# Patient Record
Sex: MALE | Race: WHITE | NOT HISPANIC OR LATINO | Employment: OTHER | URBAN - NONMETROPOLITAN AREA
[De-identification: names, ages, dates, MRNs, and addresses within clinical notes are randomized per-mention and may not be internally consistent; named-entity substitution may affect disease eponyms.]

---

## 2017-01-04 RX ORDER — TEMAZEPAM 30 MG/1
CAPSULE ORAL
Qty: 30 CAPSULE | Refills: 2 | OUTPATIENT
Start: 2017-01-04

## 2017-01-09 ENCOUNTER — OFFICE VISIT (OUTPATIENT)
Dept: FAMILY MEDICINE CLINIC | Facility: CLINIC | Age: 72
End: 2017-01-09

## 2017-01-09 VITALS
RESPIRATION RATE: 16 BRPM | SYSTOLIC BLOOD PRESSURE: 126 MMHG | OXYGEN SATURATION: 97 % | HEIGHT: 70 IN | DIASTOLIC BLOOD PRESSURE: 72 MMHG | TEMPERATURE: 97.6 F | BODY MASS INDEX: 30.49 KG/M2 | WEIGHT: 213 LBS | HEART RATE: 74 BPM

## 2017-01-09 DIAGNOSIS — L03.90 CELLULITIS, UNSPECIFIED CELLULITIS SITE: ICD-10-CM

## 2017-01-09 DIAGNOSIS — G47.00 INSOMNIA, UNSPECIFIED TYPE: Primary | ICD-10-CM

## 2017-01-09 DIAGNOSIS — M51.37 DDD (DEGENERATIVE DISC DISEASE), LUMBOSACRAL: ICD-10-CM

## 2017-01-09 PROCEDURE — 99214 OFFICE O/P EST MOD 30 MIN: CPT | Performed by: FAMILY MEDICINE

## 2017-01-09 RX ORDER — TEMAZEPAM 30 MG/1
30 CAPSULE ORAL NIGHTLY PRN
Qty: 30 CAPSULE | Refills: 2 | Status: SHIPPED | OUTPATIENT
Start: 2017-01-09 | End: 2017-05-05 | Stop reason: SDUPTHER

## 2017-01-09 RX ORDER — CEPHALEXIN 500 MG/1
500 CAPSULE ORAL 3 TIMES DAILY
Qty: 21 CAPSULE | Refills: 0 | Status: SHIPPED | OUTPATIENT
Start: 2017-01-09 | End: 2017-05-05

## 2017-01-09 NOTE — MR AVS SNAPSHOT
Rosales Vazquez   1/9/2017 8:00 AM   Office Visit    Dept Phone:  [phone]   Encounter #:  [id number]    Provider:  Tato Rinaldi MD   Department:  [facility]                Your Full Care Plan              Today's Medication Changes          These changes are accurate as of: 1/9/17 10:16 AM.  If you have any questions, ask your nurse or doctor.               New Medication(s)Ordered:     cephalexin 500 MG capsule   Commonly known as:  KEFLEX   Take 1 capsule by mouth 3 (Three) Times a Day.   Started by:  Tato Rinaldi MD         Stop taking medication(s)listed here:     levoFLOXacin 750 MG tablet   Commonly known as:  LEVAQUIN   Stopped by:  Tato Rinaldi MD           MethylPREDNISolone 4 MG tablet   Commonly known as:  MEDROL (SONIDO)   Stopped by:  Tato Rinaldi MD                Where to Get Your Medications      These medications [facility] sent to [facility] [address] - [phone] [facility] [phone] [facility] [address]     Phone:  [phone]     cephalexin 500 MG capsule         You can get these medications from any pharmacy     Bring a paper prescription for each of these medications     temazepam 30 MG capsule                  Your Updated Medication List          This list is accurate as of: 1/9/17 10:16 AM.  Always use your most recent med list.                cephalexin 500 MG capsule   Commonly known as:  KEFLEX   Take 1 capsule by mouth 3 (Three) Times a Day.       cetirizine 10 MG tablet   Commonly known as:  zyrTEC   TAKE 1 TABLET DAILY       DEXILANT 60 MG capsule   Generic drug:  dexlansoprazole       fluticasone 50 MCG/ACT nasal spray   Commonly known as:  FLONASE   2 sprays into each nostril Daily for 30 days.       irbesartan 300 MG tablet   Commonly known as:  AVAPRO   Take 1 tablet by mouth Daily.       PATADAY 0.2 % solution ophthalmic solution   Generic  "drug:  olopatadine       PROAIR  (90 BASE) MCG/ACT inhaler   Generic drug:  albuterol   INHALE 2 PUFFS FOUR TIMES DAILY AS NEEDED       temazepam 30 MG capsule   Commonly known as:  RESTORIL   Take 1 capsule by mouth At Night As Needed for sleep.               We Performed the Following     Ambulatory Referral to Neurosurgery     Urine Drug Screen       You Were Diagnosed With        Codes Comments    Insomnia, unspecified type    -  Primary ICD-10-CM: G47.00  ICD-9-CM: 780.52     Cellulitis, unspecified cellulitis site     ICD-10-CM: L03.90  ICD-9-CM: 682.9     DDD (degenerative disc disease), lumbosacral     ICD-10-CM: M51.37  ICD-9-CM: 722.52       Instructions     None    Patient Instructions History      Upcoming Appointments     Visit Type Date Time Department    OFFICE VISIT 1/9/2017  8:00 AM [facility] [employer]    OFFICE VISIT 6/14/2017  8:00 AM [facility]      MyChart Signup     Our records indicate that you have declined [employer]t signup. If you would like to sign up for [employer], please email [email or web address] or call [phone] to obtain an activation code.             Other Info from Your Visit           Your Appointments     Jun 14, 2017  8:00 AM CDT   Office Visit with Tato Rinaldi MD   [facility] (--)    [address] [place] [id number] [phone]           Arrive 15 minutes prior to appointment.              Allergies     Bee Venom      Bee and wasp stings      Reason for Visit     Insomnia 3 month f/u     Med Refill restoril       Vital Signs     Blood Pressure Pulse Temperature Respirations Height Weight    126/72 74 97.6 °F (36.4 °C) (Tympanic) 16 69.5\" (176.5 cm) 213 lb (96.6 kg)    Oxygen Saturation Body Mass Index Smoking Status             97% 31 kg/m2 Former Smoker         Problems and Diagnoses Noted     DDD (degenerative disc disease), lumbosacral    Difficulty falling or staying asleep    " Bacterial skin infection

## 2017-01-09 NOTE — PROGRESS NOTES
Subjective   Rosales Vazquez is a 71 y.o. male.   Chief Complaint   Patient presents with   • Insomnia     3 month f/u    • Med Refill     restoril        History of Present Illness   Patient with history of insomnia is in today for reevaluation and refill of controlled medication.  Medication is still effective and he is tolerating without side effect.  Patient getting at least 7 hours sleep per night.    Patient is also continuing to complain of worsening lower back pain.  History of degenerative disc disease and is requesting referral to neurosurgery.  Also complains of pain and redness at the base of the right great toe  The following portions of the patient's history were reviewed and updated as appropriate: allergies, current medications, past family history, past medical history, past social history, past surgical history and problem list.    Review of Systems   Constitutional: Negative.    HENT: Negative.    Eyes: Negative.    Respiratory: Negative.    Cardiovascular: Negative.    Gastrointestinal: Negative.    Endocrine: Negative.    Genitourinary: Negative.    Musculoskeletal: Positive for arthralgias and back pain.   Skin: Positive for color change.   Allergic/Immunologic: Negative.    Neurological: Negative.    Hematological: Negative.    Psychiatric/Behavioral: Positive for sleep disturbance.   All other systems reviewed and are negative.      Objective   Physical Exam   Constitutional: He is oriented to person, place, and time. He appears well-developed and well-nourished.   HENT:   Head: Normocephalic and atraumatic.   Right Ear: External ear normal.   Left Ear: External ear normal.   Nose: Nose normal.   Mouth/Throat: Oropharynx is clear and moist.   Eyes: Conjunctivae and EOM are normal. Pupils are equal, round, and reactive to light.   Neck: Normal range of motion. Neck supple.   Cardiovascular: Normal rate, regular rhythm, normal heart sounds and intact distal pulses.  Exam reveals no gallop  and no friction rub.    No murmur heard.  Pulmonary/Chest: Effort normal and breath sounds normal. He has no wheezes. He has no rales.   Abdominal: Soft. Bowel sounds are normal. He exhibits no mass. There is no tenderness. There is no rebound and no guarding.   Musculoskeletal:        Lumbar back: He exhibits decreased range of motion, tenderness and pain.   Neurological: He is alert and oriented to person, place, and time. He has normal reflexes. No cranial nerve deficit. He exhibits normal muscle tone.   Skin: Skin is warm and dry. No rash noted. There is erythema.        Psychiatric: He has a normal mood and affect. His behavior is normal. Judgment and thought content normal.   Nursing note and vitals reviewed.      Assessment/Plan   Rosales was seen today for insomnia and med refill.    Diagnoses and all orders for this visit:    Insomnia, unspecified type  -     Urine Drug Screen    Cellulitis, unspecified cellulitis site    DDD (degenerative disc disease), lumbosacral  -     Ambulatory Referral to Neurosurgery    Other orders  -     temazepam (RESTORIL) 30 MG capsule; Take 1 capsule by mouth At Night As Needed for sleep.  -     cephalexin (KEFLEX) 500 MG capsule; Take 1 capsule by mouth 3 (Three) Times a Day.

## 2017-01-10 LAB
AMPHETAMINES UR QL: NEGATIVE
BARBITURATES UR QL: NEGATIVE
BENZODIAZ UR QL: NEGATIVE
CANNABINOIDS UR QL SCN: NEGATIVE
COCAINE UR QL: NEGATIVE
METHADONE UR QL: NEGATIVE
OPIATES UR QL: NEGATIVE
OXYCODONE UR QL: NEGATIVE

## 2017-03-31 RX ORDER — TEMAZEPAM 30 MG/1
CAPSULE ORAL
Qty: 30 CAPSULE | Refills: 2 | OUTPATIENT
Start: 2017-03-31

## 2017-05-05 ENCOUNTER — OFFICE VISIT (OUTPATIENT)
Dept: FAMILY MEDICINE CLINIC | Facility: CLINIC | Age: 72
End: 2017-05-05

## 2017-05-05 VITALS
WEIGHT: 221 LBS | BODY MASS INDEX: 32.73 KG/M2 | RESPIRATION RATE: 14 BRPM | DIASTOLIC BLOOD PRESSURE: 80 MMHG | SYSTOLIC BLOOD PRESSURE: 152 MMHG | HEIGHT: 69 IN | OXYGEN SATURATION: 98 % | HEART RATE: 82 BPM | TEMPERATURE: 98.4 F

## 2017-05-05 DIAGNOSIS — M51.37 DDD (DEGENERATIVE DISC DISEASE), LUMBOSACRAL: ICD-10-CM

## 2017-05-05 DIAGNOSIS — J22 LOWER RESP. TRACT INFECTION: ICD-10-CM

## 2017-05-05 DIAGNOSIS — R05.9 COUGH: ICD-10-CM

## 2017-05-05 DIAGNOSIS — R06.2 WHEEZING: ICD-10-CM

## 2017-05-05 DIAGNOSIS — G47.00 INSOMNIA, UNSPECIFIED TYPE: Primary | ICD-10-CM

## 2017-05-05 PROCEDURE — 96372 THER/PROPH/DIAG INJ SC/IM: CPT | Performed by: NURSE PRACTITIONER

## 2017-05-05 PROCEDURE — 99214 OFFICE O/P EST MOD 30 MIN: CPT | Performed by: NURSE PRACTITIONER

## 2017-05-05 RX ORDER — AZITHROMYCIN 250 MG/1
TABLET, FILM COATED ORAL
Qty: 6 TABLET | Refills: 0 | Status: SHIPPED | OUTPATIENT
Start: 2017-05-05 | End: 2017-07-03

## 2017-05-05 RX ORDER — TEMAZEPAM 30 MG/1
CAPSULE ORAL
Qty: 30 CAPSULE | Refills: 2 | OUTPATIENT
Start: 2017-05-05

## 2017-05-05 RX ORDER — TEMAZEPAM 30 MG/1
30 CAPSULE ORAL NIGHTLY PRN
Qty: 30 CAPSULE | Refills: 2 | Status: SHIPPED | OUTPATIENT
Start: 2017-05-05 | End: 2017-08-02 | Stop reason: SDUPTHER

## 2017-05-05 RX ORDER — METHYLPREDNISOLONE ACETATE 80 MG/ML
80 INJECTION, SUSPENSION INTRA-ARTICULAR; INTRALESIONAL; INTRAMUSCULAR; SOFT TISSUE ONCE
Status: COMPLETED | OUTPATIENT
Start: 2017-05-05 | End: 2017-05-05

## 2017-05-05 RX ADMIN — METHYLPREDNISOLONE ACETATE 80 MG: 80 INJECTION, SUSPENSION INTRA-ARTICULAR; INTRALESIONAL; INTRAMUSCULAR; SOFT TISSUE at 15:53

## 2017-06-02 RX ORDER — CETIRIZINE HYDROCHLORIDE 10 MG/1
TABLET ORAL
Qty: 30 TABLET | Refills: 5 | Status: SHIPPED | OUTPATIENT
Start: 2017-06-02 | End: 2017-08-02 | Stop reason: SDUPTHER

## 2017-06-15 DIAGNOSIS — K21.9 GASTROESOPHAGEAL REFLUX DISEASE WITHOUT ESOPHAGITIS: Primary | ICD-10-CM

## 2017-06-15 DIAGNOSIS — K30 DELAYED GASTRIC EMPTYING: ICD-10-CM

## 2017-06-15 RX ORDER — DEXLANSOPRAZOLE 60 MG/1
CAPSULE, DELAYED RELEASE ORAL
Qty: 90 CAPSULE | Refills: 2 | Status: CANCELLED | OUTPATIENT
Start: 2017-06-15

## 2017-06-15 RX ORDER — DEXLANSOPRAZOLE 60 MG/1
60 CAPSULE, DELAYED RELEASE ORAL DAILY
Qty: 30 CAPSULE | Refills: 2 | Status: SHIPPED | OUTPATIENT
Start: 2017-06-15 | End: 2017-06-16 | Stop reason: SDUPTHER

## 2017-06-15 NOTE — TELEPHONE ENCOUNTER
06/15/2017, 0822 - Patient telephoned [phone] per this staff member with notification of patient need to schedule clinical appointment with Dr. Yost for continuation of prescription medication, Dexilant 60 MG, 1 capsule by mouth daily.  Patient agreeable to accept next available clinical appointment Tuesday, August 22, 2017 at 1:45 p.m.  Prescription medication, Dexilant 60 MG, 1 capsule by mouth daily, #30, 2 refills, submitted via e-script to patient's pharmacy of choice, [facility].

## 2017-06-16 DIAGNOSIS — K30 DELAYED GASTRIC EMPTYING: ICD-10-CM

## 2017-06-16 DIAGNOSIS — K21.9 GASTROESOPHAGEAL REFLUX DISEASE WITHOUT ESOPHAGITIS: ICD-10-CM

## 2017-06-16 RX ORDER — DEXLANSOPRAZOLE 60 MG/1
60 CAPSULE, DELAYED RELEASE ORAL DAILY
Qty: 90 CAPSULE | Refills: 1 | Status: SHIPPED | OUTPATIENT
Start: 2017-06-16 | End: 2017-08-22 | Stop reason: SDUPTHER

## 2017-06-19 ENCOUNTER — LAB (OUTPATIENT)
Dept: LAB | Facility: OTHER | Age: 72
End: 2017-06-19

## 2017-06-19 DIAGNOSIS — E78.5 HYPERLIPIDEMIA, UNSPECIFIED HYPERLIPIDEMIA TYPE: ICD-10-CM

## 2017-06-19 DIAGNOSIS — R73.9 HYPERGLYCEMIA: ICD-10-CM

## 2017-06-19 LAB
ALBUMIN SERPL-MCNC: 4 G/DL (ref 3.2–5.5)
ALBUMIN/GLOB SERPL: 1.3 G/DL (ref 1–3)
ALP SERPL-CCNC: 87 U/L (ref 15–121)
ALT SERPL W P-5'-P-CCNC: 21 U/L (ref 10–60)
ANION GAP SERPL CALCULATED.3IONS-SCNC: 7 MMOL/L (ref 5–15)
ARTICHOKE IGE QN: 163 MG/DL (ref 0–129)
AST SERPL-CCNC: 23 U/L (ref 10–60)
BILIRUB SERPL-MCNC: 0.7 MG/DL (ref 0.2–1)
BUN BLD-MCNC: 14 MG/DL (ref 8–25)
BUN/CREAT SERPL: 17.5 (ref 7–25)
CALCIUM SPEC-SCNC: 9.1 MG/DL (ref 8.4–10.8)
CHLORIDE SERPL-SCNC: 103 MMOL/L (ref 100–112)
CO2 SERPL-SCNC: 28 MMOL/L (ref 20–32)
CREAT BLD-MCNC: 0.8 MG/DL (ref 0.4–1.3)
GFR SERPL CREATININE-BSD FRML MDRD: 95 ML/MIN/1.73 (ref 42–98)
GLOBULIN UR ELPH-MCNC: 3.1 GM/DL (ref 2.5–4.6)
GLUCOSE BLD-MCNC: 118 MG/DL (ref 70–100)
POTASSIUM BLD-SCNC: 4.4 MMOL/L (ref 3.4–5.4)
PROT SERPL-MCNC: 7.1 G/DL (ref 6.7–8.2)
SODIUM BLD-SCNC: 138 MMOL/L (ref 134–146)

## 2017-06-19 PROCEDURE — 83721 ASSAY OF BLOOD LIPOPROTEIN: CPT | Performed by: FAMILY MEDICINE

## 2017-06-19 PROCEDURE — 36415 COLL VENOUS BLD VENIPUNCTURE: CPT | Performed by: FAMILY MEDICINE

## 2017-06-19 PROCEDURE — 80053 COMPREHEN METABOLIC PANEL: CPT | Performed by: FAMILY MEDICINE

## 2017-06-19 PROCEDURE — 83036 HEMOGLOBIN GLYCOSYLATED A1C: CPT | Performed by: FAMILY MEDICINE

## 2017-06-20 LAB — HBA1C MFR BLD: 5.85 % (ref 4–5.6)

## 2017-07-03 ENCOUNTER — OFFICE VISIT (OUTPATIENT)
Dept: FAMILY MEDICINE CLINIC | Facility: CLINIC | Age: 72
End: 2017-07-03

## 2017-07-03 VITALS
WEIGHT: 214 LBS | SYSTOLIC BLOOD PRESSURE: 138 MMHG | RESPIRATION RATE: 16 BRPM | HEART RATE: 76 BPM | OXYGEN SATURATION: 98 % | TEMPERATURE: 98.7 F | DIASTOLIC BLOOD PRESSURE: 68 MMHG | BODY MASS INDEX: 31.7 KG/M2 | HEIGHT: 69 IN

## 2017-07-03 DIAGNOSIS — K21.9 GASTROESOPHAGEAL REFLUX DISEASE WITHOUT ESOPHAGITIS: ICD-10-CM

## 2017-07-03 DIAGNOSIS — I10 ESSENTIAL HYPERTENSION: Primary | ICD-10-CM

## 2017-07-03 DIAGNOSIS — J32.9 SINUSITIS, UNSPECIFIED CHRONICITY, UNSPECIFIED LOCATION: ICD-10-CM

## 2017-07-03 DIAGNOSIS — M51.37 DDD (DEGENERATIVE DISC DISEASE), LUMBOSACRAL: ICD-10-CM

## 2017-07-03 DIAGNOSIS — Z12.5 ENCOUNTER FOR SCREENING FOR MALIGNANT NEOPLASM OF PROSTATE: ICD-10-CM

## 2017-07-03 DIAGNOSIS — R73.9 HYPERGLYCEMIA: ICD-10-CM

## 2017-07-03 PROCEDURE — 99214 OFFICE O/P EST MOD 30 MIN: CPT | Performed by: FAMILY MEDICINE

## 2017-07-03 PROCEDURE — 96372 THER/PROPH/DIAG INJ SC/IM: CPT | Performed by: FAMILY MEDICINE

## 2017-07-03 RX ORDER — BIMATOPROST 0.01 %
DROPS OPHTHALMIC (EYE)
COMMUNITY
Start: 2017-05-14

## 2017-07-03 RX ORDER — CEFDINIR 300 MG/1
300 CAPSULE ORAL 2 TIMES DAILY
Qty: 20 CAPSULE | Refills: 0 | Status: SHIPPED | OUTPATIENT
Start: 2017-07-03 | End: 2017-08-02

## 2017-07-03 RX ORDER — METHYLPREDNISOLONE ACETATE 80 MG/ML
80 INJECTION, SUSPENSION INTRA-ARTICULAR; INTRALESIONAL; INTRAMUSCULAR; SOFT TISSUE ONCE
Status: COMPLETED | OUTPATIENT
Start: 2017-07-03 | End: 2017-07-03

## 2017-07-03 RX ORDER — BENZONATATE 100 MG/1
100 CAPSULE ORAL 3 TIMES DAILY PRN
Qty: 30 CAPSULE | Refills: 0 | Status: SHIPPED | OUTPATIENT
Start: 2017-07-03 | End: 2017-08-02

## 2017-07-03 RX ADMIN — METHYLPREDNISOLONE ACETATE 80 MG: 80 INJECTION, SUSPENSION INTRA-ARTICULAR; INTRALESIONAL; INTRAMUSCULAR; SOFT TISSUE at 08:37

## 2017-07-03 NOTE — PROGRESS NOTES
Subjective   Rosales Vazquez is a 71 y.o. male who presents to the office for follow-up and review of labs.     History of Present Illness   Patient with history of hypertension, hyperglycemia, and hyperlipidemia is in today for reevaluation and review labs.  Patient is complaining of one-week history of cough congestion with purulent sputum.  He denies any fevers.  Otherwise he has been doing well with no chest pains PND or orthopnea.  Denies neurological symptoms.    The following portions of the patient's history were reviewed and updated as appropriate: allergies, current medications, past family history, past medical history, past social history, past surgical history and problem list.    Review of Systems   Constitutional: Positive for fatigue.   HENT: Positive for congestion and sore throat.    Eyes: Negative.    Respiratory: Positive for cough.    Cardiovascular: Negative.    Gastrointestinal: Negative.    Endocrine: Negative.    Genitourinary: Negative.    Musculoskeletal: Positive for arthralgias and back pain.   Skin: Negative.    Allergic/Immunologic: Negative.    Neurological: Negative.    Hematological: Negative.    Psychiatric/Behavioral: Positive for sleep disturbance.   All other systems reviewed and are negative.      Objective   Physical Exam   Constitutional: He is oriented to person, place, and time. He appears well-developed and well-nourished.   HENT:   Head: Normocephalic and atraumatic.   Right Ear: External ear normal.   Left Ear: External ear normal.   Nose: Mucosal edema and rhinorrhea present.   Mouth/Throat: Posterior oropharyngeal edema present.   Eyes: Conjunctivae and EOM are normal. Pupils are equal, round, and reactive to light.   Neck: Normal range of motion. Neck supple.   Cardiovascular: Normal rate, regular rhythm, normal heart sounds and intact distal pulses.  Exam reveals no gallop and no friction rub.    No murmur heard.  Pulmonary/Chest: Effort normal. He has no  wheezes. He has rhonchi. He has no rales.   Abdominal: Soft. Bowel sounds are normal. He exhibits no mass. There is no tenderness. There is no rebound and no guarding.   Musculoskeletal: Normal range of motion.   Neurological: He is alert and oriented to person, place, and time. He has normal reflexes. No cranial nerve deficit. He exhibits normal muscle tone.   Skin: Skin is warm and dry. No rash noted.   Psychiatric: He has a normal mood and affect. His behavior is normal. Judgment and thought content normal.   Nursing note and vitals reviewed.      Assessment/Plan   Rosales was seen today for hypertension, hyperlipidemia and uri.    Diagnoses and all orders for this visit:    Essential hypertension  -     CBC & Differential; Future  -     Comprehensive Metabolic Panel; Future  -     Lipid Panel; Future  -     TSH; Future  -     PSA Screen; Future    Gastroesophageal reflux disease without esophagitis    DDD (degenerative disc disease), lumbosacral    Hyperglycemia  -     Hemoglobin A1c; Future    Sinusitis, unspecified chronicity, unspecified location  -     methylPREDNISolone acetate (DEPO-medrol) injection 80 mg; Inject 1 mL into the shoulder, thigh, or buttocks 1 (One) Time.    Encounter for screening for malignant neoplasm of prostate   -     PSA Screen; Future    Other orders  -     cefdinir (OMNICEF) 300 MG capsule; Take 1 capsule by mouth 2 (Two) Times a Day.  -     benzonatate (TESSALON PERLES) 100 MG capsule; Take 1 capsule by mouth 3 (Three) Times a Day As Needed for Cough.    Patient refuses statins secondary to side effects     Labs are reviewed with patient.    PHQ-9 Depression Screening 7/3/2017   Little interest or pleasure in doing things 0   Feeling down, depressed, or hopeless 0   PHQ-9 Total Score 0         Lab on 06/19/2017   Component Date Value Ref Range Status   • Glucose 06/19/2017 118* 70 - 100 mg/dL Final   • BUN 06/19/2017 14  8 - 25 mg/dL Final   • Creatinine 06/19/2017 0.80  0.40 -  1.30 mg/dL Final   • Sodium 06/19/2017 138  134 - 146 mmol/L Final   • Potassium 06/19/2017 4.4  3.4 - 5.4 mmol/L Final   • Chloride 06/19/2017 103  100 - 112 mmol/L Final   • CO2 06/19/2017 28.0  20.0 - 32.0 mmol/L Final   • Calcium 06/19/2017 9.1  8.4 - 10.8 mg/dL Final   • Total Protein 06/19/2017 7.1  6.7 - 8.2 g/dL Final   • Albumin 06/19/2017 4.00  3.20 - 5.50 g/dL Final   • ALT (SGPT) 06/19/2017 21  10 - 60 U/L Final   • AST (SGOT) 06/19/2017 23  10 - 60 U/L Final   • Alkaline Phosphatase 06/19/2017 87  15 - 121 U/L Final   • Total Bilirubin 06/19/2017 0.7  0.2 - 1.0 mg/dL Final   • eGFR Non African Amer 06/19/2017 95  42 - 98 mL/min/1.73 Final   • Globulin 06/19/2017 3.1  2.5 - 4.6 gm/dL Final   • A/G Ratio 06/19/2017 1.3  1.0 - 3.0 g/dL Final   • BUN/Creatinine Ratio 06/19/2017 17.5  7.0 - 25.0 Final   • Anion Gap 06/19/2017 7.0  5.0 - 15.0 mmol/L Final   • Hemoglobin A1C 06/19/2017 5.85* 4 - 5.6 % Final   • LDL Cholesterol  06/19/2017 163* 0 - 129 mg/dL Final   Office Visit on 01/09/2017   Component Date Value Ref Range Status   • Barbiturates Screen, Urine 01/09/2017 Negative  Negative Final   • Benzodiazepine Screen, Urine 01/09/2017 Negative  Negative Final   • Opiate Screen, Urine 01/09/2017 Negative  Negative Final   • THC Screen Interpretation 01/09/2017 Negative  Negative Final   • Amphet/Methamphet, Screen, Urine 01/09/2017 Negative  Negative Final   • Cocaine Screen, Urine 01/09/2017 Negative  Negative Final   • Oxycodone Screen, Urine 01/09/2017 Negative  Negative Final   • Methadone Screen, Urine 01/09/2017 Negative  Negative Final    Comment: CUTOFF VALUES  Benzo     200 ng/mL   Daniella        200 ng/mL           Cocaine   150 ng/mL   Amph      500 ng/mL  THC          20 ng/mL  Opiates    300 ng/mL  Oxycod    100 ng/mL  Mthadone 300 ng/mL       ]

## 2017-08-02 ENCOUNTER — OFFICE VISIT (OUTPATIENT)
Dept: FAMILY MEDICINE CLINIC | Facility: CLINIC | Age: 72
End: 2017-08-02

## 2017-08-02 VITALS
SYSTOLIC BLOOD PRESSURE: 132 MMHG | OXYGEN SATURATION: 94 % | TEMPERATURE: 98.1 F | BODY MASS INDEX: 31.07 KG/M2 | RESPIRATION RATE: 14 BRPM | WEIGHT: 217 LBS | HEART RATE: 79 BPM | DIASTOLIC BLOOD PRESSURE: 80 MMHG | HEIGHT: 70 IN

## 2017-08-02 DIAGNOSIS — J30.89 NON-SEASONAL ALLERGIC RHINITIS DUE TO OTHER ALLERGIC TRIGGER: ICD-10-CM

## 2017-08-02 DIAGNOSIS — G47.00 INSOMNIA, UNSPECIFIED TYPE: Primary | ICD-10-CM

## 2017-08-02 PROCEDURE — 99213 OFFICE O/P EST LOW 20 MIN: CPT | Performed by: FAMILY MEDICINE

## 2017-08-02 RX ORDER — CETIRIZINE HYDROCHLORIDE 10 MG/1
10 TABLET ORAL DAILY
Qty: 30 TABLET | Refills: 5 | Status: SHIPPED | OUTPATIENT
Start: 2017-08-02 | End: 2018-07-30 | Stop reason: SDUPTHER

## 2017-08-02 RX ORDER — TEMAZEPAM 30 MG/1
30 CAPSULE ORAL NIGHTLY PRN
Qty: 30 CAPSULE | Refills: 2 | Status: SHIPPED | OUTPATIENT
Start: 2017-08-02 | End: 2017-10-23 | Stop reason: SDUPTHER

## 2017-08-02 NOTE — PROGRESS NOTES
Subjective   Rosales Vazquez is a 72 y.o. male.   Chief Complaint   Patient presents with   • Insomnia     3 mo controlled requesting refill on Temazepam       Insomnia   This is a chronic problem. The current episode started more than 1 year ago. The problem occurs daily. The problem has been unchanged. Associated symptoms include arthralgias, congestion and fatigue. The symptoms are aggravated by stress. Treatments tried: restoril. The treatment provided significant relief.        The following portions of the patient's history were reviewed and updated as appropriate: allergies, current medications, past family history, past medical history, past social history, past surgical history and problem list.    Review of Systems   Constitutional: Positive for fatigue.   HENT: Positive for congestion.    Eyes: Negative.    Respiratory: Negative.    Cardiovascular: Negative.    Gastrointestinal: Negative.    Endocrine: Negative.    Genitourinary: Negative.    Musculoskeletal: Positive for arthralgias.   Skin: Negative.    Allergic/Immunologic: Negative.    Neurological: Negative.    Hematological: Negative.    Psychiatric/Behavioral: The patient has insomnia.    All other systems reviewed and are negative.      Objective   Physical Exam   Constitutional: He is oriented to person, place, and time. He appears well-developed and well-nourished.   HENT:   Head: Normocephalic and atraumatic.   Right Ear: External ear normal.   Left Ear: External ear normal.   Nose: Mucosal edema and rhinorrhea present.   Mouth/Throat: Posterior oropharyngeal edema present.   Eyes: Conjunctivae and EOM are normal. Pupils are equal, round, and reactive to light.   Neck: Normal range of motion. Neck supple.   Cardiovascular: Normal rate, regular rhythm, normal heart sounds and intact distal pulses.  Exam reveals no gallop and no friction rub.    No murmur heard.  Pulmonary/Chest: Effort normal. He has no wheezes. He has rhonchi. He has no  rales.   Abdominal: Soft. Bowel sounds are normal. He exhibits no mass. There is no tenderness. There is no rebound and no guarding.   Musculoskeletal: Normal range of motion.   Neurological: He is alert and oriented to person, place, and time. He has normal reflexes. No cranial nerve deficit. He exhibits normal muscle tone.   Skin: Skin is warm and dry. No rash noted.   Psychiatric: He has a normal mood and affect. His behavior is normal. Judgment and thought content normal.   Nursing note and vitals reviewed.    [employer] reviewed    Assessment/Plan   Rosales was seen today for insomnia.    Diagnoses and all orders for this visit:    Insomnia, unspecified type    Non-seasonal allergic rhinitis due to other allergic trigger    Other orders  -     temazepam (RESTORIL) 30 MG capsule; Take 1 capsule by mouth At Night As Needed for Sleep.  -     cetirizine (zyrTEC) 10 MG tablet; Take 1 tablet by mouth Daily.

## 2017-08-22 ENCOUNTER — OFFICE VISIT (OUTPATIENT)
Dept: GASTROENTEROLOGY | Facility: CLINIC | Age: 72
End: 2017-08-22

## 2017-08-22 VITALS
SYSTOLIC BLOOD PRESSURE: 121 MMHG | HEIGHT: 69 IN | WEIGHT: 222.2 LBS | DIASTOLIC BLOOD PRESSURE: 74 MMHG | HEART RATE: 89 BPM | BODY MASS INDEX: 32.91 KG/M2

## 2017-08-22 DIAGNOSIS — K30 DELAYED GASTRIC EMPTYING: ICD-10-CM

## 2017-08-22 DIAGNOSIS — K21.9 GASTROESOPHAGEAL REFLUX DISEASE WITHOUT ESOPHAGITIS: ICD-10-CM

## 2017-08-22 PROCEDURE — 99213 OFFICE O/P EST LOW 20 MIN: CPT | Performed by: INTERNAL MEDICINE

## 2017-08-22 RX ORDER — DEXLANSOPRAZOLE 60 MG/1
60 CAPSULE, DELAYED RELEASE ORAL DAILY
Qty: 90 CAPSULE | Refills: 1 | Status: SHIPPED | OUTPATIENT
Start: 2017-08-22 | End: 2018-01-02 | Stop reason: SDUPTHER

## 2017-08-26 NOTE — PROGRESS NOTES
Chief Complaint   Patient presents with   • Prescription Medication Renewal     Dexilant 60 MG, 1 capsule by mouth daily        Rosales Vazquez is a  72 y.o. male here for a follow up visit for GERD, delayed gastric emptying    HPI :  Patient returns for follow-up.  He continues on Dexalone 60 mg daily and with this has absolutely no reflux symptoms.  He is extremely pleased with the results.  Last colonoscopy was done in 2016.  No family history of carcinoma the colon.  Patient is aware of my impending FPC  Past Medical History:   Diagnosis Date   • Acute bronchitis    • Acute lower respiratory infection    • Acute sinusitis    • Allergic rhinitis    • Anemia    • Aspiration pneumonitis    • Cough    • Deficiency of testosterone biosynthesis    • Delayed gastric emptying    • Diverticular disease of colon     history of recurrent bleeding   • Elevated blood pressure reading without diagnosis of hypertension    • Encounter for screening for malignant neoplasm of prostate    • GERD (gastroesophageal reflux disease)    • Hip pain    • Hyperglycemia    • Hyperlipidemia    • Hypertension    • 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 30 tablet 5   • dexlansoprazole (DEXILANT) 60 MG capsule Take 1 capsule by mouth Daily. 90 capsule 1   • irbesartan (AVAPRO) 300 MG tablet Take 1 tablet by mouth Daily. 14 tablet 0   • LUMIGAN 0.01 % ophthalmic drops      • PROAIR  (90 BASE) MCG/ACT inhaler INHALE 2 PUFFS FOUR TIMES DAILY AS NEEDED 25.5 g 5   • temazepam (RESTORIL) 30 MG capsule Take 1 capsule by mouth At Night As Needed for Sleep. 30 capsule 2     No current facility-administered medications for this visit.        PRN Meds:.    Allergies   Allergen Reactions   • Bee Venom      Bee  and wasp stings       Social History     Social History   • Marital status:      Spouse name: Dariela   • Number of children: 2   • Years of education: N/A     Occupational History   • Not on file.     Social History Main Topics   • Smoking status: Former Smoker     Types: Cigarettes     Quit date: 10/10/1986   • Smokeless tobacco: Never Used      Comment: Patient states he ceased smoking 30 years plus prior to todays clincial appointment date of 08/22/2017.     • Alcohol use No      Comment: Patient states he does not partake of alcoholic consumption - ceased in 1963.     • Drug use: No   • Sexual activity: Yes     Partners: Female      Comment:  - 51 Years.     Other Topics Concern   • Not on file     Social History Narrative       Family History   Problem Relation Age of Onset   • Heart disease Father        Review of Systems   Constitutional: Negative for activity change, chills, diaphoresis and unexpected weight change.   Cardiovascular: Negative for chest pain.   Gastrointestinal: Negative for abdominal distention, abdominal pain, anal bleeding, blood in stool, constipation, diarrhea, nausea, rectal pain and vomiting.   Musculoskeletal: Negative for arthralgias and myalgias.       Vitals:    08/22/17 1358   BP: 121/74   Pulse: 89       Physical Exam   Constitutional: He appears well-developed and well-nourished.   Cardiovascular: Normal rate, regular rhythm and normal heart sounds.  Exam reveals no gallop and no friction rub.    No murmur heard.  Pulmonary/Chest: Effort normal and breath sounds normal. No respiratory distress. He has no rales.   Abdominal: Soft. Normal appearance and bowel sounds are normal. He exhibits no distension and no ascites. There is no hepatosplenomegaly, splenomegaly or hepatomegaly. There is no tenderness. No hernia.   Nursing note and vitals reviewed.      ASSESSMENT AND PLAN      ICD-10-CM ICD-9-CM   1. Gastroesophageal reflux disease without esophagitis K21.9 530.81    2. Delayed gastric emptying K30 536.8      Plan:  Continue Dexalone 60 mg daily, prescription rewritten  Follow-up with Dr. Gentry Thompson and gastroenterology when necessary        This document has been electronically signed by Cristopher Yost MD on August 26, 2017 2:12 PM                                   “EMR Dragon/Transcription disclaimer:   Much of this encounter note is an electronic transcription/translation of spoken language to printed text. The electronic translation of spoken language may permit erroneous, or at times, nonsensical words or phrases to be inadvertently transcribed; Although I have reviewed the note for such errors, some may still exist.”

## 2017-09-05 ENCOUNTER — CLINICAL SUPPORT (OUTPATIENT)
Dept: FAMILY MEDICINE CLINIC | Facility: CLINIC | Age: 72
End: 2017-09-05

## 2017-09-05 ENCOUNTER — OFFICE VISIT (OUTPATIENT)
Dept: FAMILY MEDICINE CLINIC | Facility: CLINIC | Age: 72
End: 2017-09-05

## 2017-09-05 VITALS
OXYGEN SATURATION: 93 % | TEMPERATURE: 98.6 F | BODY MASS INDEX: 32.88 KG/M2 | DIASTOLIC BLOOD PRESSURE: 64 MMHG | HEIGHT: 69 IN | HEART RATE: 74 BPM | SYSTOLIC BLOOD PRESSURE: 118 MMHG | WEIGHT: 222 LBS

## 2017-09-05 DIAGNOSIS — S99.921A FOOT INJURY, RIGHT, INITIAL ENCOUNTER: Primary | ICD-10-CM

## 2017-09-05 DIAGNOSIS — Z23 NEED FOR DIPHTHERIA-TETANUS-PERTUSSIS (TDAP) VACCINE, ADULT/ADOLESCENT: Primary | ICD-10-CM

## 2017-09-05 PROCEDURE — 90715 TDAP VACCINE 7 YRS/> IM: CPT | Performed by: FAMILY MEDICINE

## 2017-09-05 PROCEDURE — 99213 OFFICE O/P EST LOW 20 MIN: CPT | Performed by: FAMILY MEDICINE

## 2017-09-05 PROCEDURE — 90471 IMMUNIZATION ADMIN: CPT | Performed by: FAMILY MEDICINE

## 2017-09-05 RX ORDER — CIPROFLOXACIN 500 MG/1
500 TABLET, FILM COATED ORAL 2 TIMES DAILY
Qty: 20 TABLET | Refills: 0 | Status: SHIPPED | OUTPATIENT
Start: 2017-09-05 | End: 2017-10-23

## 2017-09-05 NOTE — PROGRESS NOTES
Subjective   Rosales Vazquez is a 72 y.o. male.   Chief Complaint   Patient presents with   • Foot Injury     stepped on a nail   • Gout     to left elbow       Foot Injury    The incident occurred 1 to 3 hours ago. The incident occurred at home. Injury mechanism: stepped on nail. The pain is present in the right foot. The quality of the pain is described as aching. The pain is at a severity of 5/10. The pain is moderate. The pain has been constant since onset. Associated symptoms include an inability to bear weight. The symptoms are aggravated by weight bearing. He has tried nothing for the symptoms.        The following portions of the patient's history were reviewed and updated as appropriate: allergies, current medications, past family history, past medical history, past social history, past surgical history and problem list.    Review of Systems   Constitutional: Negative.    HENT: Negative.    Eyes: Negative.    Respiratory: Negative.    Cardiovascular: Negative.    Gastrointestinal: Negative.    Endocrine: Negative.    Genitourinary: Negative.    Musculoskeletal: Positive for arthralgias and joint swelling.   Skin: Negative.    Allergic/Immunologic: Negative.    Neurological: Negative.    Hematological: Negative.    Psychiatric/Behavioral: Negative.    All other systems reviewed and are negative.      Objective   Physical Exam   Constitutional: He is oriented to person, place, and time. He appears well-developed and well-nourished.   HENT:   Head: Normocephalic and atraumatic.   Right Ear: External ear normal.   Left Ear: External ear normal.   Nose: Rhinorrhea present.   Eyes: Conjunctivae and EOM are normal. Pupils are equal, round, and reactive to light.   Neck: Normal range of motion. Neck supple.   Cardiovascular: Normal rate, regular rhythm, normal heart sounds and intact distal pulses.  Exam reveals no gallop and no friction rub.    No murmur heard.  Pulmonary/Chest: Effort normal. He has no  wheezes. He has rhonchi. He has no rales.   Abdominal: Soft. Bowel sounds are normal. He exhibits no mass. There is no tenderness. There is no rebound and no guarding.   Musculoskeletal:        Left elbow: He exhibits decreased range of motion, swelling and effusion. Tenderness found.        Right foot: There is tenderness and laceration.   Neurological: He is alert and oriented to person, place, and time. He has normal reflexes. No cranial nerve deficit. He exhibits normal muscle tone.   Skin: Skin is warm and dry. No rash noted.   Psychiatric: He has a normal mood and affect. His behavior is normal. Judgment and thought content normal.   Nursing note and vitals reviewed.      Assessment/Plan   Rosales was seen today for foot injury and gout.    Diagnoses and all orders for this visit:    Foot injury, right, initial encounter    Other orders  -     ciprofloxacin (CIPRO) 500 MG tablet; Take 1 tablet by mouth 2 (Two) Times a Day.        Patient will go to the injection clinic at [facility] for tetanus update

## 2017-09-25 RX ORDER — IRBESARTAN 300 MG/1
TABLET ORAL
Qty: 90 TABLET | Refills: 2 | Status: SHIPPED | OUTPATIENT
Start: 2017-09-25 | End: 2018-01-02 | Stop reason: SDUPTHER

## 2017-10-23 ENCOUNTER — OFFICE VISIT (OUTPATIENT)
Dept: FAMILY MEDICINE CLINIC | Facility: CLINIC | Age: 72
End: 2017-10-23

## 2017-10-23 VITALS
BODY MASS INDEX: 31.5 KG/M2 | HEIGHT: 70 IN | DIASTOLIC BLOOD PRESSURE: 60 MMHG | SYSTOLIC BLOOD PRESSURE: 134 MMHG | RESPIRATION RATE: 16 BRPM | WEIGHT: 220 LBS | HEART RATE: 83 BPM | OXYGEN SATURATION: 96 % | TEMPERATURE: 98 F

## 2017-10-23 DIAGNOSIS — F51.01 PRIMARY INSOMNIA: Primary | ICD-10-CM

## 2017-10-23 DIAGNOSIS — I10 ESSENTIAL HYPERTENSION: ICD-10-CM

## 2017-10-23 PROCEDURE — 99213 OFFICE O/P EST LOW 20 MIN: CPT | Performed by: FAMILY MEDICINE

## 2017-10-23 RX ORDER — TEMAZEPAM 30 MG/1
30 CAPSULE ORAL NIGHTLY PRN
Qty: 30 CAPSULE | Refills: 2 | Status: SHIPPED | OUTPATIENT
Start: 2017-10-23 | End: 2018-01-25 | Stop reason: SDUPTHER

## 2017-12-29 ENCOUNTER — LAB (OUTPATIENT)
Dept: LAB | Facility: OTHER | Age: 72
End: 2017-12-29

## 2017-12-29 DIAGNOSIS — R73.9 HYPERGLYCEMIA: ICD-10-CM

## 2017-12-29 DIAGNOSIS — Z12.5 ENCOUNTER FOR SCREENING FOR MALIGNANT NEOPLASM OF PROSTATE: ICD-10-CM

## 2017-12-29 DIAGNOSIS — I10 ESSENTIAL HYPERTENSION: ICD-10-CM

## 2017-12-29 LAB
ALBUMIN SERPL-MCNC: 3.8 G/DL (ref 3.2–5.5)
ALBUMIN/GLOB SERPL: 1.1 G/DL (ref 1–3)
ALP SERPL-CCNC: 82 U/L (ref 15–121)
ALT SERPL W P-5'-P-CCNC: 27 U/L (ref 10–60)
ANION GAP SERPL CALCULATED.3IONS-SCNC: 9 MMOL/L (ref 5–15)
AST SERPL-CCNC: 27 U/L (ref 10–60)
BASOPHILS # BLD AUTO: 0.08 10*3/MM3 (ref 0–0.2)
BASOPHILS NFR BLD AUTO: 1.2 % (ref 0–2)
BILIRUB SERPL-MCNC: 0.9 MG/DL (ref 0.2–1)
BUN BLD-MCNC: 9 MG/DL (ref 8–25)
BUN/CREAT SERPL: 15 (ref 7–25)
CALCIUM SPEC-SCNC: 9.4 MG/DL (ref 8.4–10.8)
CHLORIDE SERPL-SCNC: 102 MMOL/L (ref 100–112)
CHOLEST SERPL-MCNC: 233 MG/DL (ref 150–200)
CO2 SERPL-SCNC: 30 MMOL/L (ref 20–32)
CREAT BLD-MCNC: 0.6 MG/DL (ref 0.4–1.3)
DEPRECATED RDW RBC AUTO: 45.1 FL (ref 35.1–43.9)
EOSINOPHIL # BLD AUTO: 0.7 10*3/MM3 (ref 0–0.7)
EOSINOPHIL NFR BLD AUTO: 10.7 % (ref 0–7)
ERYTHROCYTE [DISTWIDTH] IN BLOOD BY AUTOMATED COUNT: 14.6 % (ref 11.5–14.5)
GFR SERPL CREATININE-BSD FRML MDRD: 132 ML/MIN/1.73 (ref 42–98)
GLOBULIN UR ELPH-MCNC: 3.4 GM/DL (ref 2.5–4.6)
GLUCOSE BLD-MCNC: 109 MG/DL (ref 70–100)
HBA1C MFR BLD: 5.8 % (ref 4–5.6)
HCT VFR BLD AUTO: 44.6 % (ref 39–49)
HDLC SERPL-MCNC: 36 MG/DL (ref 35–100)
HGB BLD-MCNC: 15.2 G/DL (ref 13.7–17.3)
LDLC SERPL CALC-MCNC: 137 MG/DL
LDLC/HDLC SERPL: 3.8 {RATIO}
LYMPHOCYTES # BLD AUTO: 1.73 10*3/MM3 (ref 0.6–4.2)
LYMPHOCYTES NFR BLD AUTO: 26.5 % (ref 10–50)
MCH RBC QN AUTO: 29.4 PG (ref 26.5–34)
MCHC RBC AUTO-ENTMCNC: 34.1 G/DL (ref 31.5–36.3)
MCV RBC AUTO: 86.3 FL (ref 80–98)
MONOCYTES # BLD AUTO: 0.62 10*3/MM3 (ref 0–0.9)
MONOCYTES NFR BLD AUTO: 9.5 % (ref 0–12)
NEUTROPHILS # BLD AUTO: 3.39 10*3/MM3 (ref 2–8.6)
NEUTROPHILS NFR BLD AUTO: 52.1 % (ref 37–80)
PLATELET # BLD AUTO: 182 10*3/MM3 (ref 150–450)
PMV BLD AUTO: 10 FL (ref 8–12)
POTASSIUM BLD-SCNC: 4 MMOL/L (ref 3.4–5.4)
PROT SERPL-MCNC: 7.2 G/DL (ref 6.7–8.2)
PSA SERPL-MCNC: 2.64 NG/ML (ref 0–4)
RBC # BLD AUTO: 5.17 10*6/MM3 (ref 4.37–5.74)
SODIUM BLD-SCNC: 141 MMOL/L (ref 134–146)
TRIGL SERPL-MCNC: 301 MG/DL (ref 35–160)
TSH SERPL DL<=0.05 MIU/L-ACNC: 4.15 MIU/ML (ref 0.46–4.68)
VLDLC SERPL-MCNC: 60.2 MG/DL
WBC NRBC COR # BLD: 6.52 10*3/MM3 (ref 3.2–9.8)

## 2017-12-29 PROCEDURE — 84443 ASSAY THYROID STIM HORMONE: CPT | Performed by: FAMILY MEDICINE

## 2017-12-29 PROCEDURE — 80061 LIPID PANEL: CPT | Performed by: FAMILY MEDICINE

## 2017-12-29 PROCEDURE — 83036 HEMOGLOBIN GLYCOSYLATED A1C: CPT | Performed by: FAMILY MEDICINE

## 2017-12-29 PROCEDURE — G0103 PSA SCREENING: HCPCS | Performed by: FAMILY MEDICINE

## 2017-12-29 PROCEDURE — 80053 COMPREHEN METABOLIC PANEL: CPT | Performed by: FAMILY MEDICINE

## 2017-12-29 PROCEDURE — 36415 COLL VENOUS BLD VENIPUNCTURE: CPT | Performed by: FAMILY MEDICINE

## 2017-12-29 PROCEDURE — 85025 COMPLETE CBC W/AUTO DIFF WBC: CPT | Performed by: FAMILY MEDICINE

## 2018-01-02 ENCOUNTER — OFFICE VISIT (OUTPATIENT)
Dept: FAMILY MEDICINE CLINIC | Facility: CLINIC | Age: 73
End: 2018-01-02

## 2018-01-02 VITALS
DIASTOLIC BLOOD PRESSURE: 78 MMHG | OXYGEN SATURATION: 95 % | HEIGHT: 70 IN | WEIGHT: 225.31 LBS | BODY MASS INDEX: 32.26 KG/M2 | RESPIRATION RATE: 20 BRPM | HEART RATE: 75 BPM | TEMPERATURE: 98 F | SYSTOLIC BLOOD PRESSURE: 142 MMHG

## 2018-01-02 DIAGNOSIS — M51.37 DDD (DEGENERATIVE DISC DISEASE), LUMBOSACRAL: ICD-10-CM

## 2018-01-02 DIAGNOSIS — I10 ESSENTIAL HYPERTENSION: Primary | ICD-10-CM

## 2018-01-02 DIAGNOSIS — R73.9 HYPERGLYCEMIA: ICD-10-CM

## 2018-01-02 DIAGNOSIS — K30 DELAYED GASTRIC EMPTYING: ICD-10-CM

## 2018-01-02 DIAGNOSIS — R79.9 ABNORMAL FINDING OF BLOOD CHEMISTRY: ICD-10-CM

## 2018-01-02 DIAGNOSIS — K21.9 GASTROESOPHAGEAL REFLUX DISEASE WITHOUT ESOPHAGITIS: ICD-10-CM

## 2018-01-02 PROCEDURE — 99214 OFFICE O/P EST MOD 30 MIN: CPT | Performed by: FAMILY MEDICINE

## 2018-01-02 RX ORDER — IRBESARTAN 300 MG/1
300 TABLET ORAL DAILY
Qty: 30 TABLET | Refills: 0 | Status: SHIPPED | OUTPATIENT
Start: 2018-01-02 | End: 2018-11-25 | Stop reason: SDUPTHER

## 2018-01-02 RX ORDER — IRBESARTAN 300 MG/1
300 TABLET ORAL DAILY
Qty: 90 TABLET | Refills: 2 | Status: SHIPPED | OUTPATIENT
Start: 2018-01-02 | End: 2018-01-02 | Stop reason: SDUPTHER

## 2018-01-02 RX ORDER — DEXLANSOPRAZOLE 60 MG/1
60 CAPSULE, DELAYED RELEASE ORAL DAILY
Qty: 90 CAPSULE | Refills: 2 | Status: SHIPPED | OUTPATIENT
Start: 2018-01-02 | End: 2019-01-11 | Stop reason: SDUPTHER

## 2018-01-02 NOTE — PROGRESS NOTES
Subjective   Rosales Vazquez is a 72 y.o. male who presents to the office for follow-up and review of labs.     History of Present Illness   Patient with history of hypertension, hyperlipidemia, and hyperglycemia is in today for reevaluation and to review labs.  Patient continues to have a lot of neck and lower back pain and is scheduled for reevaluation by neurosurgery in the next couple weeks with scheduling of surgery.  Patient denies chest pain, PND, orthopnea.  He has been out of his blood pressure medicine and stomach medicine for about a week.    The following portions of the patient's history were reviewed and updated as appropriate: allergies, current medications, past family history, past medical history, past social history, past surgical history and problem list.    Review of Systems   Constitutional: Positive for fatigue.   HENT: Negative.    Eyes: Negative.    Respiratory: Negative.    Cardiovascular: Negative.    Gastrointestinal: Negative.    Endocrine: Negative.    Genitourinary: Negative.    Musculoskeletal: Positive for arthralgias, back pain, neck pain and neck stiffness.   Skin: Negative.    Allergic/Immunologic: Negative.    Neurological: Positive for weakness and numbness.   Hematological: Negative.    Psychiatric/Behavioral: Positive for sleep disturbance.   All other systems reviewed and are negative.      Objective   Physical Exam   Constitutional: He is oriented to person, place, and time. He appears well-developed and well-nourished.   HENT:   Head: Normocephalic and atraumatic.   Right Ear: External ear normal.   Left Ear: External ear normal.   Eyes: Conjunctivae and EOM are normal. Pupils are equal, round, and reactive to light.   Neck: Normal range of motion. Neck supple.   Cardiovascular: Normal rate, regular rhythm, normal heart sounds and intact distal pulses.  Exam reveals no gallop and no friction rub.    No murmur heard.  Pulmonary/Chest: Effort normal. He has no wheezes.  He has rhonchi. He has no rales.   Abdominal: Soft. Bowel sounds are normal. He exhibits no mass. There is no tenderness. There is no rebound and no guarding.   Musculoskeletal:        Cervical back: He exhibits decreased range of motion and tenderness.        Lumbar back: He exhibits decreased range of motion and tenderness.   Neurological: He is alert and oriented to person, place, and time. He has normal reflexes. No cranial nerve deficit. He exhibits normal muscle tone.   Skin: Skin is warm and dry. No rash noted.   Psychiatric: He has a normal mood and affect. His behavior is normal. Judgment and thought content normal.   Nursing note and vitals reviewed.      Assessment/Plan   Rosales was seen today for hypertension.    Diagnoses and all orders for this visit:    Essential hypertension    Gastroesophageal reflux disease without esophagitis  -     dexlansoprazole (DEXILANT) 60 MG capsule; Take 1 capsule by mouth Daily.    Delayed gastric emptying  -     dexlansoprazole (DEXILANT) 60 MG capsule; Take 1 capsule by mouth Daily.    Hyperglycemia  -     Comprehensive Metabolic Panel; Future  -     Hemoglobin A1c; Future  -     Triglycerides; Future    DDD (degenerative disc disease), lumbosacral    Abnormal finding of blood chemistry   -     Triglycerides; Future    Other orders  -     Discontinue: irbesartan (AVAPRO) 300 MG tablet; Take 1 tablet by mouth Daily.  -     irbesartan (AVAPRO) 300 MG tablet; Take 1 tablet by mouth Daily.         Labs are reviewed with patient.    PHQ-2/PHQ-9 Depression Screening 1/2/2018   Little interest or pleasure in doing things 0   Feeling down, depressed, or hopeless 0   Total Score 0         Lab on 12/29/2017   Component Date Value Ref Range Status   • Glucose 12/29/2017 109* 70 - 100 mg/dL Final   • BUN 12/29/2017 9  8 - 25 mg/dL Final   • Creatinine 12/29/2017 0.60  0.40 - 1.30 mg/dL Final   • Sodium 12/29/2017 141  134 - 146 mmol/L Final   • Potassium 12/29/2017 4.0  3.4 - 5.4  mmol/L Final   • Chloride 12/29/2017 102  100 - 112 mmol/L Final   • CO2 12/29/2017 30.0  20.0 - 32.0 mmol/L Final   • Calcium 12/29/2017 9.4  8.4 - 10.8 mg/dL Final   • Total Protein 12/29/2017 7.2  6.7 - 8.2 g/dL Final   • Albumin 12/29/2017 3.80  3.20 - 5.50 g/dL Final   • ALT (SGPT) 12/29/2017 27  10 - 60 U/L Final   • AST (SGOT) 12/29/2017 27  10 - 60 U/L Final   • Alkaline Phosphatase 12/29/2017 82  15 - 121 U/L Final   • Total Bilirubin 12/29/2017 0.9  0.2 - 1.0 mg/dL Final   • eGFR Non African Amer 12/29/2017 132* 42 - 98 mL/min/1.73 Final   • Globulin 12/29/2017 3.4  2.5 - 4.6 gm/dL Final   • A/G Ratio 12/29/2017 1.1  1.0 - 3.0 g/dL Final   • BUN/Creatinine Ratio 12/29/2017 15.0  7.0 - 25.0 Final   • Anion Gap 12/29/2017 9.0  5.0 - 15.0 mmol/L Final   • Hemoglobin A1C 12/29/2017 5.8* 4 - 5.6 % Final   • Total Cholesterol 12/29/2017 233* 150 - 200 mg/dL Final   • Triglycerides 12/29/2017 301* 35 - 160 mg/dL Final   • HDL Cholesterol 12/29/2017 36  35 - 100 mg/dL Final   • LDL Cholesterol  12/29/2017 137  mg/dL Final   • VLDL Cholesterol 12/29/2017 60.2  mg/dL Final   • LDL/HDL Ratio 12/29/2017 3.80   Final   • TSH 12/29/2017 4.150  0.460 - 4.680 mIU/mL Final   • PSA 12/29/2017 2.640  0.000 - 4.000 ng/mL Final   • WBC 12/29/2017 6.52  3.20 - 9.80 10*3/mm3 Final   • RBC 12/29/2017 5.17  4.37 - 5.74 10*6/mm3 Final   • Hemoglobin 12/29/2017 15.2  13.7 - 17.3 g/dL Final   • Hematocrit 12/29/2017 44.6  39.0 - 49.0 % Final   • MCV 12/29/2017 86.3  80.0 - 98.0 fL Final   • MCH 12/29/2017 29.4  26.5 - 34.0 pg Final   • MCHC 12/29/2017 34.1  31.5 - 36.3 g/dL Final   • RDW 12/29/2017 14.6* 11.5 - 14.5 % Final   • RDW-SD 12/29/2017 45.1* 35.1 - 43.9 fl Final   • MPV 12/29/2017 10.0  8.0 - 12.0 fL Final   • Platelets 12/29/2017 182  150 - 450 10*3/mm3 Final   • Neutrophil % 12/29/2017 52.1  37.0 - 80.0 % Final   • Lymphocyte % 12/29/2017 26.5  10.0 - 50.0 % Final   • Monocyte % 12/29/2017 9.5  0.0 - 12.0 % Final   •  Eosinophil % 12/29/2017 10.7* 0.0 - 7.0 % Final   • Basophil % 12/29/2017 1.2  0.0 - 2.0 % Final   • Neutrophils, Absolute 12/29/2017 3.39  2.00 - 8.60 10*3/mm3 Final   • Lymphocytes, Absolute 12/29/2017 1.73  0.60 - 4.20 10*3/mm3 Final   • Monocytes, Absolute 12/29/2017 0.62  0.00 - 0.90 10*3/mm3 Final   • Eosinophils, Absolute 12/29/2017 0.70  0.00 - 0.70 10*3/mm3 Final   • Basophils, Absolute 12/29/2017 0.08  0.00 - 0.20 10*3/mm3 Final   ]

## 2018-01-24 RX ORDER — TEMAZEPAM 30 MG/1
CAPSULE ORAL
Qty: 30 CAPSULE | Refills: 2 | OUTPATIENT
Start: 2018-01-24

## 2018-01-25 RX ORDER — TEMAZEPAM 30 MG/1
30 CAPSULE ORAL NIGHTLY PRN
Qty: 4 CAPSULE | Refills: 0 | Status: SHIPPED | OUTPATIENT
Start: 2018-01-25 | End: 2018-01-29 | Stop reason: SDUPTHER

## 2018-01-29 ENCOUNTER — OFFICE VISIT (OUTPATIENT)
Dept: FAMILY MEDICINE CLINIC | Facility: CLINIC | Age: 73
End: 2018-01-29

## 2018-01-29 ENCOUNTER — LAB (OUTPATIENT)
Dept: LAB | Facility: OTHER | Age: 73
End: 2018-01-29

## 2018-01-29 VITALS
HEIGHT: 69 IN | TEMPERATURE: 97.9 F | OXYGEN SATURATION: 97 % | DIASTOLIC BLOOD PRESSURE: 80 MMHG | HEART RATE: 71 BPM | SYSTOLIC BLOOD PRESSURE: 140 MMHG | WEIGHT: 230 LBS | RESPIRATION RATE: 16 BRPM | BODY MASS INDEX: 34.07 KG/M2

## 2018-01-29 DIAGNOSIS — G47.00 INSOMNIA, UNSPECIFIED TYPE: Primary | ICD-10-CM

## 2018-01-29 DIAGNOSIS — Z51.81 ENCOUNTER FOR THERAPEUTIC DRUG LEVEL MONITORING: ICD-10-CM

## 2018-01-29 DIAGNOSIS — G47.00 INSOMNIA, UNSPECIFIED TYPE: ICD-10-CM

## 2018-01-29 LAB
AMPHET+METHAMPHET UR QL: NEGATIVE
BARBITURATES UR QL SCN: NEGATIVE
BENZODIAZ UR QL SCN: NEGATIVE
CANNABINOIDS SERPL QL: NEGATIVE
COCAINE UR QL: NEGATIVE
METHADONE UR QL SCN: NEGATIVE
OPIATES UR QL: NEGATIVE
OXYCODONE UR QL SCN: NEGATIVE

## 2018-01-29 PROCEDURE — 80307 DRUG TEST PRSMV CHEM ANLYZR: CPT | Performed by: FAMILY MEDICINE

## 2018-01-29 PROCEDURE — 99213 OFFICE O/P EST LOW 20 MIN: CPT | Performed by: FAMILY MEDICINE

## 2018-01-29 RX ORDER — TEMAZEPAM 30 MG/1
30 CAPSULE ORAL NIGHTLY PRN
Qty: 4 CAPSULE | Refills: 0 | Status: SHIPPED | OUTPATIENT
Start: 2018-01-29 | End: 2018-01-29 | Stop reason: SDUPTHER

## 2018-01-29 RX ORDER — TEMAZEPAM 30 MG/1
30 CAPSULE ORAL NIGHTLY PRN
Qty: 30 CAPSULE | Refills: 2 | Status: SHIPPED | OUTPATIENT
Start: 2018-01-29 | End: 2018-05-04 | Stop reason: SDUPTHER

## 2018-01-29 NOTE — PROGRESS NOTES
Subjective   Rosales Vazquez is a 72 y.o. male.   Chief Complaint   Patient presents with   • Insomnia     control refill on Temazepam       Insomnia   This is a chronic problem. The current episode started more than 1 year ago. The problem occurs daily. The problem has been unchanged. Associated symptoms include arthralgias, fatigue, neck pain, numbness and weakness. Treatments tried: temazapam. The treatment provided significant relief.        The following portions of the patient's history were reviewed and updated as appropriate: allergies, current medications, past family history, past medical history, past social history, past surgical history and problem list.    Review of Systems   Constitutional: Positive for fatigue.   HENT: Negative.    Eyes: Negative.    Respiratory: Negative.    Cardiovascular: Negative.    Gastrointestinal: Negative.    Endocrine: Negative.    Genitourinary: Negative.    Musculoskeletal: Positive for arthralgias, back pain, neck pain and neck stiffness.   Skin: Negative.    Allergic/Immunologic: Negative.    Neurological: Positive for weakness and numbness.   Hematological: Negative.    Psychiatric/Behavioral: Positive for sleep disturbance. The patient has insomnia.    All other systems reviewed and are negative.      Objective   Physical Exam   Constitutional: He is oriented to person, place, and time. He appears well-developed and well-nourished.   HENT:   Head: Normocephalic and atraumatic.   Right Ear: External ear normal.   Left Ear: External ear normal.   Eyes: Conjunctivae and EOM are normal. Pupils are equal, round, and reactive to light.   Neck: Normal range of motion. Neck supple.   Cardiovascular: Normal rate, regular rhythm, normal heart sounds and intact distal pulses.  Exam reveals no gallop and no friction rub.    No murmur heard.  Pulmonary/Chest: Effort normal. He has no wheezes. He has no rales.   Abdominal: Soft. Bowel sounds are normal. He exhibits no mass.  There is no tenderness. There is no rebound and no guarding.   Musculoskeletal:        Cervical back: He exhibits decreased range of motion and tenderness.        Lumbar back: He exhibits decreased range of motion and tenderness.   Neurological: He is alert and oriented to person, place, and time. He has normal reflexes. No cranial nerve deficit. He exhibits normal muscle tone.   Skin: Skin is warm and dry. No rash noted.   Psychiatric: He has a normal mood and affect. His behavior is normal. Judgment and thought content normal.   Nursing note and vitals reviewed.  F[facility]se reviewed    Assessment/Plan   Rosales was seen today for insomnia.    Diagnoses and all orders for this visit:    Insomnia, unspecified type  -     temazepam (RESTORIL) 30 MG capsule; Take 1 capsule by mouth At Night As Needed for Sleep.  -     Urine Drug Screen - Urine, Clean Catch; Future    Encounter for therapeutic drug level monitoring   -     Urine Drug Screen - Urine, Clean Catch; Future

## 2018-03-21 ENCOUNTER — OFFICE VISIT (OUTPATIENT)
Dept: FAMILY MEDICINE CLINIC | Facility: CLINIC | Age: 73
End: 2018-03-21

## 2018-03-21 VITALS
SYSTOLIC BLOOD PRESSURE: 140 MMHG | WEIGHT: 227.6 LBS | OXYGEN SATURATION: 96 % | TEMPERATURE: 98.3 F | HEART RATE: 88 BPM | HEIGHT: 69 IN | DIASTOLIC BLOOD PRESSURE: 80 MMHG | BODY MASS INDEX: 33.71 KG/M2

## 2018-03-21 DIAGNOSIS — G56.03 BILATERAL CARPAL TUNNEL SYNDROME: Primary | ICD-10-CM

## 2018-03-21 PROCEDURE — 99212 OFFICE O/P EST SF 10 MIN: CPT | Performed by: FAMILY MEDICINE

## 2018-03-21 NOTE — PROGRESS NOTES
Danish Vazquez is a 72 y.o. male.   Chief Complaint   Patient presents with   • Suture / Staple Removal       History of Present Illness   Patient presents today for suture removal after recent lateral carpal tunnel surgery.    The following portions of the patient's history were reviewed and updated as appropriate: allergies, current medications, past family history, past medical history, past social history, past surgical history and problem list.    Review of Systems   Constitutional: Positive for fatigue.   HENT: Negative.    Eyes: Negative.    Respiratory: Negative.    Cardiovascular: Negative.    Gastrointestinal: Negative.    Endocrine: Negative.    Genitourinary: Negative.    Musculoskeletal: Positive for arthralgias, back pain, neck pain and neck stiffness.   Skin: Negative.    Allergic/Immunologic: Negative.    Neurological: Positive for weakness and numbness.   Hematological: Negative.    Psychiatric/Behavioral: Positive for sleep disturbance.   All other systems reviewed and are negative.      Objective   Physical Exam   Constitutional: He is oriented to person, place, and time. He appears well-developed and well-nourished.   HENT:   Head: Normocephalic and atraumatic.   Right Ear: External ear normal.   Left Ear: External ear normal.   Eyes: Conjunctivae and EOM are normal. Pupils are equal, round, and reactive to light.   Neck: Normal range of motion. Neck supple.   Cardiovascular: Normal rate, regular rhythm, normal heart sounds and intact distal pulses.  Exam reveals no gallop and no friction rub.    No murmur heard.  Pulmonary/Chest: Effort normal. He has no wheezes. He has no rales.   Abdominal: Soft. Bowel sounds are normal. He exhibits no mass. There is no tenderness. There is no rebound and no guarding.   Musculoskeletal:        Right wrist: He exhibits laceration.        Left wrist: He exhibits laceration.        Cervical back: He exhibits decreased range of motion and  tenderness.        Lumbar back: He exhibits decreased range of motion and tenderness.   Neurological: He is alert and oriented to person, place, and time. He has normal reflexes. No cranial nerve deficit. He exhibits normal muscle tone.   Skin: Skin is warm and dry. No rash noted.   Psychiatric: He has a normal mood and affect. His behavior is normal. Judgment and thought content normal.   Nursing note and vitals reviewed.      Assessment/Plan   Rosales was seen today for suture / staple removal.    Diagnoses and all orders for this visit:    Bilateral carpal tunnel syndrome        Sutures removed without difficulty.

## 2018-05-04 ENCOUNTER — OFFICE VISIT (OUTPATIENT)
Dept: FAMILY MEDICINE CLINIC | Facility: CLINIC | Age: 73
End: 2018-05-04

## 2018-05-04 VITALS
HEIGHT: 69 IN | BODY MASS INDEX: 33.5 KG/M2 | TEMPERATURE: 97.3 F | WEIGHT: 226.2 LBS | DIASTOLIC BLOOD PRESSURE: 80 MMHG | SYSTOLIC BLOOD PRESSURE: 159 MMHG | OXYGEN SATURATION: 97 % | HEART RATE: 85 BPM

## 2018-05-04 DIAGNOSIS — G47.00 INSOMNIA, UNSPECIFIED TYPE: ICD-10-CM

## 2018-05-04 DIAGNOSIS — I10 ESSENTIAL HYPERTENSION: Primary | ICD-10-CM

## 2018-05-04 PROCEDURE — 99213 OFFICE O/P EST LOW 20 MIN: CPT | Performed by: FAMILY MEDICINE

## 2018-05-04 RX ORDER — TEMAZEPAM 30 MG/1
30 CAPSULE ORAL NIGHTLY PRN
Qty: 30 CAPSULE | Refills: 2 | Status: SHIPPED | OUTPATIENT
Start: 2018-05-04 | End: 2018-07-30 | Stop reason: SDUPTHER

## 2018-05-04 NOTE — PROGRESS NOTES
Subjective   Rosales Vazquez is a 72 y.o. male.   Chief Complaint   Patient presents with   • Insomnia     control refill on Temazepam       Insomnia   This is a chronic problem. The current episode started more than 1 year ago. The problem occurs daily. The problem has been unchanged. Associated symptoms include arthralgias, fatigue, neck pain, numbness and weakness. Treatments tried: Restoril. The treatment provided significant relief.        The following portions of the patient's history were reviewed and updated as appropriate: allergies, current medications, past family history, past medical history, past social history, past surgical history and problem list.    Review of Systems   Constitutional: Positive for fatigue.   HENT: Negative.    Eyes: Negative.    Respiratory: Negative.    Cardiovascular: Negative.    Gastrointestinal: Negative.    Endocrine: Negative.    Genitourinary: Negative.    Musculoskeletal: Positive for arthralgias, back pain, neck pain and neck stiffness.   Skin: Negative.    Allergic/Immunologic: Negative.    Neurological: Positive for weakness and numbness.   Hematological: Negative.    Psychiatric/Behavioral: Positive for sleep disturbance. The patient has insomnia.    All other systems reviewed and are negative.      Objective   Physical Exam   Constitutional: He is oriented to person, place, and time. He appears well-developed and well-nourished.   HENT:   Head: Normocephalic and atraumatic.   Right Ear: External ear normal.   Left Ear: External ear normal.   Eyes: Conjunctivae and EOM are normal. Pupils are equal, round, and reactive to light.   Neck: Normal range of motion. Neck supple.   Cardiovascular: Normal rate, regular rhythm, normal heart sounds and intact distal pulses.  Exam reveals no gallop and no friction rub.    No murmur heard.  Pulmonary/Chest: Effort normal. He has no wheezes. He has no rales.   Abdominal: Soft. Bowel sounds are normal. He exhibits no mass.  There is no tenderness. There is no rebound and no guarding.   Musculoskeletal:        Right wrist: He exhibits laceration.        Left wrist: He exhibits laceration.        Cervical back: He exhibits decreased range of motion and tenderness.        Lumbar back: He exhibits decreased range of motion and tenderness.   Neurological: He is alert and oriented to person, place, and time. He has normal reflexes. No cranial nerve deficit. He exhibits normal muscle tone.   Skin: Skin is warm and dry. No rash noted.   Psychiatric: He has a normal mood and affect. His behavior is normal. Judgment and thought content normal.   Nursing note and vitals reviewed.  Finesse reviewed    Assessment/Plan   Rosales was seen today for insomnia.    Diagnoses and all orders for this visit:    Essential hypertension    Insomnia, unspecified type  -     temazepam (RESTORIL) 30 MG capsule; Take 1 capsule by mouth At Night As Needed for Sleep.

## 2018-06-04 RX ORDER — CETIRIZINE HYDROCHLORIDE 10 MG/1
TABLET ORAL
Qty: 30 TABLET | Refills: 5 | Status: SHIPPED | OUTPATIENT
Start: 2018-06-04

## 2018-07-30 ENCOUNTER — LAB (OUTPATIENT)
Dept: LAB | Facility: OTHER | Age: 73
End: 2018-07-30

## 2018-07-30 ENCOUNTER — OFFICE VISIT (OUTPATIENT)
Dept: FAMILY MEDICINE CLINIC | Facility: CLINIC | Age: 73
End: 2018-07-30

## 2018-07-30 VITALS
DIASTOLIC BLOOD PRESSURE: 64 MMHG | OXYGEN SATURATION: 96 % | SYSTOLIC BLOOD PRESSURE: 138 MMHG | HEART RATE: 84 BPM | HEIGHT: 69 IN | TEMPERATURE: 98.2 F | BODY MASS INDEX: 31.9 KG/M2 | WEIGHT: 215.4 LBS

## 2018-07-30 DIAGNOSIS — I10 ESSENTIAL HYPERTENSION: Primary | ICD-10-CM

## 2018-07-30 DIAGNOSIS — R73.9 HYPERGLYCEMIA: ICD-10-CM

## 2018-07-30 DIAGNOSIS — F51.01 PRIMARY INSOMNIA: Primary | ICD-10-CM

## 2018-07-30 DIAGNOSIS — I10 ESSENTIAL HYPERTENSION: ICD-10-CM

## 2018-07-30 DIAGNOSIS — G47.00 INSOMNIA, UNSPECIFIED TYPE: ICD-10-CM

## 2018-07-30 DIAGNOSIS — M51.37 DDD (DEGENERATIVE DISC DISEASE), LUMBOSACRAL: ICD-10-CM

## 2018-07-30 LAB
ALBUMIN SERPL-MCNC: 4 G/DL (ref 3.5–5)
ALBUMIN/GLOB SERPL: 1.3 G/DL (ref 1.1–1.8)
ALP SERPL-CCNC: 114 U/L (ref 38–126)
ALT SERPL W P-5'-P-CCNC: 25 U/L
ANION GAP SERPL CALCULATED.3IONS-SCNC: 14 MMOL/L (ref 5–15)
AST SERPL-CCNC: 24 U/L (ref 17–59)
BILIRUB SERPL-MCNC: 0.6 MG/DL (ref 0.2–1.3)
BUN BLD-MCNC: 17 MG/DL (ref 9–20)
BUN/CREAT SERPL: 23.6 (ref 7–25)
CALCIUM SPEC-SCNC: 8.9 MG/DL (ref 8.4–10.2)
CHLORIDE SERPL-SCNC: 101 MMOL/L (ref 98–107)
CO2 SERPL-SCNC: 24 MMOL/L (ref 22–30)
CREAT BLD-MCNC: 0.72 MG/DL (ref 0.66–1.25)
GFR SERPL CREATININE-BSD FRML MDRD: 106 ML/MIN/1.73 (ref 42–98)
GLOBULIN UR ELPH-MCNC: 3.1 GM/DL (ref 2.3–3.5)
GLUCOSE BLD-MCNC: 159 MG/DL (ref 74–99)
HBA1C MFR BLD: 5.8 % (ref 4–5.6)
POTASSIUM BLD-SCNC: 3.9 MMOL/L (ref 3.4–5)
PROT SERPL-MCNC: 7.1 G/DL (ref 6.3–8.2)
SODIUM BLD-SCNC: 139 MMOL/L (ref 137–145)
TRIGL SERPL-MCNC: 227 MG/DL

## 2018-07-30 PROCEDURE — 83036 HEMOGLOBIN GLYCOSYLATED A1C: CPT | Performed by: FAMILY MEDICINE

## 2018-07-30 PROCEDURE — 80053 COMPREHEN METABOLIC PANEL: CPT | Performed by: FAMILY MEDICINE

## 2018-07-30 PROCEDURE — 84478 ASSAY OF TRIGLYCERIDES: CPT | Performed by: FAMILY MEDICINE

## 2018-07-30 PROCEDURE — 36415 COLL VENOUS BLD VENIPUNCTURE: CPT | Performed by: FAMILY MEDICINE

## 2018-07-30 PROCEDURE — 99213 OFFICE O/P EST LOW 20 MIN: CPT | Performed by: FAMILY MEDICINE

## 2018-07-30 RX ORDER — TEMAZEPAM 30 MG/1
30 CAPSULE ORAL NIGHTLY PRN
Qty: 30 CAPSULE | Refills: 2 | Status: SHIPPED | OUTPATIENT
Start: 2018-07-30 | End: 2018-10-02 | Stop reason: SDUPTHER

## 2018-07-30 RX ORDER — NABUMETONE 750 MG/1
750 TABLET, FILM COATED ORAL 2 TIMES DAILY
Qty: 60 TABLET | Refills: 5 | Status: SHIPPED | OUTPATIENT
Start: 2018-07-30 | End: 2018-08-27

## 2018-07-30 NOTE — PROGRESS NOTES
Subjective   Rosales Vazquez is a 73 y.o. male.   Chief Complaint   Patient presents with   • DDD, lumbosacral     controlled refill        Insomnia   This is a chronic problem. The current episode started more than 1 year ago. The problem occurs daily. The problem has been unchanged. Associated symptoms include arthralgias, fatigue and joint swelling. The symptoms are aggravated by stress. Treatments tried: Temazapam. The treatment provided significant relief.   Arthritis   Presents for follow-up visit. He complains of pain, stiffness and joint swelling. The symptoms have been worsening. Affected locations include the left MCP, right MCP, left shoulder, right shoulder, left hip, right hip, right knee and left knee. His pain is at a severity of 7/10. Associated symptoms include fatigue.        The following portions of the patient's history were reviewed and updated as appropriate: allergies, current medications, past family history, past medical history, past social history, past surgical history and problem list.    Review of Systems   Constitutional: Positive for fatigue.   HENT: Negative.    Eyes: Negative.    Respiratory: Negative.    Cardiovascular: Negative.    Gastrointestinal: Negative.    Endocrine: Negative.    Genitourinary: Negative.    Musculoskeletal: Positive for arthralgias, arthritis, back pain, joint swelling and stiffness.   Skin: Negative.    Allergic/Immunologic: Negative.    Neurological: Negative.    Hematological: Negative.    Psychiatric/Behavioral: Positive for sleep disturbance. The patient has insomnia.    All other systems reviewed and are negative.      Objective   Physical Exam   Constitutional: He is oriented to person, place, and time. He appears well-developed and well-nourished.   HENT:   Head: Normocephalic and atraumatic.   Right Ear: External ear normal.   Left Ear: External ear normal.   Eyes: Pupils are equal, round, and reactive to light. Conjunctivae and EOM are  normal.   Neck: Normal range of motion. Neck supple.   Cardiovascular: Normal rate, regular rhythm, normal heart sounds and intact distal pulses.  Exam reveals no gallop and no friction rub.    No murmur heard.  Pulmonary/Chest: Effort normal. He has no wheezes. He has no rales.   Abdominal: Soft. Bowel sounds are normal. He exhibits no mass. There is no tenderness. There is no rebound and no guarding.   Musculoskeletal:        Right wrist: He exhibits laceration.        Left wrist: He exhibits laceration.        Right knee: He exhibits decreased range of motion. Tenderness found.        Left knee: He exhibits decreased range of motion. Tenderness found.        Cervical back: He exhibits decreased range of motion and tenderness.        Lumbar back: He exhibits decreased range of motion and tenderness.   Neurological: He is alert and oriented to person, place, and time. He has normal reflexes. No cranial nerve deficit. He exhibits normal muscle tone.   Skin: Skin is warm and dry. No rash noted.   Psychiatric: He has a normal mood and affect. His behavior is normal. Judgment and thought content normal.   Nursing note and vitals reviewed.  Finesse reviewed    Assessment/Plan   Rosales was seen today for ddd, lumbosacral.    Diagnoses and all orders for this visit:    Primary insomnia    Essential hypertension    DDD (degenerative disc disease), lumbosacral    Insomnia, unspecified type  -     temazepam (RESTORIL) 30 MG capsule; Take 1 capsule by mouth At Night As Needed for Sleep.    Other orders  -     nabumetone (RELAFEN) 750 MG tablet; Take 1 tablet by mouth 2 (Two) Times a Day.

## 2018-10-02 DIAGNOSIS — G47.00 INSOMNIA, UNSPECIFIED TYPE: ICD-10-CM

## 2018-10-02 RX ORDER — TEMAZEPAM 30 MG/1
30 CAPSULE ORAL NIGHTLY PRN
Qty: 30 CAPSULE | Refills: 2 | Status: SHIPPED | OUTPATIENT
Start: 2018-10-02 | End: 2018-10-30 | Stop reason: SDUPTHER

## 2018-10-30 DIAGNOSIS — G47.00 INSOMNIA, UNSPECIFIED TYPE: ICD-10-CM

## 2018-10-30 RX ORDER — TEMAZEPAM 30 MG/1
30 CAPSULE ORAL NIGHTLY PRN
Qty: 30 CAPSULE | Refills: 0 | Status: CANCELLED | OUTPATIENT
Start: 2018-10-30

## 2018-10-30 NOTE — TELEPHONE ENCOUNTER
"Dr. Tato Rinaldi Patient.  Last office appt with [facility] was on 07/30/2018. Patient states \"[facility] said I need to call you to get a refill on the Temazepam, the script Dr. Rinaldi wrote on 10/02/2018 was marked no refills\".   Patient notified, 30 day supply from Dr. Galo, pt will need to Establish care w/another provider due to PCP, Dr. Rinaldi no longer at [facility]. Call transfered to Central Scheduling to make an appt to [facility] care.  "

## 2018-10-30 NOTE — TELEPHONE ENCOUNTER
Spoke to REILLY [email or web address] they do not have a script dated from 10/02/2018.

## 2018-10-30 NOTE — TELEPHONE ENCOUNTER
Script has been on hold at the Central Scheduling office here at [facility] for pt to . [facility]. Hard script is marked with zero refills by Dr. Rinaldi.

## 2018-11-26 RX ORDER — IRBESARTAN 300 MG/1
TABLET ORAL
Qty: 90 TABLET | Refills: 0 | Status: SHIPPED | OUTPATIENT
Start: 2018-11-26

## 2019-01-11 DIAGNOSIS — K30 DELAYED GASTRIC EMPTYING: ICD-10-CM

## 2019-01-11 DIAGNOSIS — K21.9 GASTROESOPHAGEAL REFLUX DISEASE WITHOUT ESOPHAGITIS: ICD-10-CM

## 2019-01-11 RX ORDER — DEXLANSOPRAZOLE 60 MG/1
60 CAPSULE, DELAYED RELEASE ORAL DAILY
Qty: 90 CAPSULE | Refills: 0 | Status: SHIPPED | OUTPATIENT
Start: 2019-01-11

## 2019-01-11 NOTE — TELEPHONE ENCOUNTER
Last office appt with Finesse was on 07/30/2018. 1 x90 day supply given, pt will need to est care with a new provider.

## 2019-04-15 DIAGNOSIS — K21.9 GASTROESOPHAGEAL REFLUX DISEASE WITHOUT ESOPHAGITIS: ICD-10-CM

## 2019-04-15 DIAGNOSIS — K30 DELAYED GASTRIC EMPTYING: ICD-10-CM

## 2019-04-15 RX ORDER — DEXLANSOPRAZOLE 60 MG/1
CAPSULE, DELAYED RELEASE ORAL
Qty: 90 CAPSULE | Refills: 0 | OUTPATIENT
Start: 2019-04-15